# Patient Record
Sex: MALE | Race: WHITE | Employment: UNEMPLOYED | URBAN - NONMETROPOLITAN AREA
[De-identification: names, ages, dates, MRNs, and addresses within clinical notes are randomized per-mention and may not be internally consistent; named-entity substitution may affect disease eponyms.]

---

## 2017-07-10 ENCOUNTER — HOSPITAL ENCOUNTER (OUTPATIENT)
Dept: NEUROLOGY | Age: 27
Discharge: HOME | End: 2017-07-10
Payer: MEDICAID

## 2017-07-10 DIAGNOSIS — R20.2 NUMBNESS AND TINGLING IN BOTH HANDS: ICD-10-CM

## 2017-07-10 DIAGNOSIS — M79.641 PAIN IN BOTH HANDS: ICD-10-CM

## 2017-07-10 DIAGNOSIS — R20.0 NUMBNESS AND TINGLING IN BOTH HANDS: ICD-10-CM

## 2017-07-10 DIAGNOSIS — M54.2 PAIN, NECK: ICD-10-CM

## 2017-07-10 DIAGNOSIS — M79.642 PAIN IN BOTH HANDS: ICD-10-CM

## 2017-07-10 PROCEDURE — 95911 NRV CNDJ TEST 9-10 STUDIES: CPT | Performed by: PSYCHIATRY & NEUROLOGY

## 2017-07-10 PROCEDURE — 95886 MUSC TEST DONE W/N TEST COMP: CPT

## 2017-07-10 PROCEDURE — 95911 NRV CNDJ TEST 9-10 STUDIES: CPT

## 2017-07-10 PROCEDURE — 95886 MUSC TEST DONE W/N TEST COMP: CPT | Performed by: PSYCHIATRY & NEUROLOGY

## 2020-03-19 ENCOUNTER — NURSE TRIAGE (OUTPATIENT)
Dept: CALL CENTER | Facility: HOSPITAL | Age: 30
End: 2020-03-19

## 2020-03-19 NOTE — TELEPHONE ENCOUNTER
"    Reason for Disposition  • Health Information question, no triage required and triager able to answer question    Additional Information  • Negative: [1] Caller is not with the adult (patient) AND [2] reporting urgent symptoms  • Negative: Lab result questions  • Negative: Medication questions  • Negative: Caller can't be reached by phone  • Negative: Caller has already spoken to PCP or another triager  • Negative: RN needs further essential information from caller in order to complete triage  • Negative: Requesting regular office appointment  • Negative: [1] Caller requesting NON-URGENT health information AND [2] PCP's office is the best resource    Answer Assessment - Initial Assessment Questions  1. REASON FOR CALL or QUESTION: \"What is your reason for calling today?\" or \"How can I best help you?\" or \"What question do you have that I can help answer?\"      He has cough, nasal congestion, itchy, watery eyes. He wants to know if he should go to work with this. He feels that it is allergy/sinus related. He was instructed that he should not return to work until symptoms are gone or he is released by provider.    Protocols used: INFORMATION ONLY CALL-ADULT-      "

## 2020-04-07 ENCOUNTER — NURSE TRIAGE (OUTPATIENT)
Dept: CALL CENTER | Facility: HOSPITAL | Age: 30
End: 2020-04-07

## 2020-04-07 NOTE — TELEPHONE ENCOUNTER
"Red rash around tick bite about size of dime. Removed the tick ans says he removed the head. Area is red and tender to touch.    Reason for Disposition  • [1] Red or very tender (to touch) area AND [2] started over 24 hours after the bite    Additional Information  • Negative: Sounds like a life-threatening emergency to the triager  • Negative: Not a tick bite  • Negative: [1] 2 to 14 days following tick bite AND [2] severe headache with fever occurs  • Negative: [1] 2 to 14 days following tick bite AND [2] widespread rash with fever occurs  • Negative: Patient sounds very sick or weak to the triager  • Negative: [1] Fever AND [2] red area  • Negative: [1] Fever AND [2] area is very tender to touch  • Negative: [1] Red streak or red line AND [2] length > 2 inches (5 cm)  • Negative: Can't remove live tick (after trying Care Advice)  • Negative: Can't remove tick's head that was broken off in the skin (after trying Care Advice)  • Negative: [1] 2 to 14 days following tick bite AND [2] fever AND [3] no rash or headache  • Negative: [1] 2 to 14 days following tick bite AND [2] widespread rash or headache AND [3] no fever    Answer Assessment - Initial Assessment Questions  1. TYPE of TICK: \"Is it a wood tick or a deer tick?\" If unsure, ask: \"What size was the tick?\" \"Did it look more like a watermelon seed or a poppy seed?\"   small tick  2. LOCATION: \"Where is the tick bite located?\"      Behind right knee  3. ONSET: \"How long do you think the tick was attached before you removed it?\" (Hours or days)       Possibly 3 days  4. TETANUS: \"When was the last tetanus booster?\"      2017  5. PREGNANCY: \"Is there any chance you are pregnant?\" \"When was your last menstrual period?\"      na    Protocols used: TICK BITE-ADULT-AH      "

## 2020-04-11 ENCOUNTER — NURSE TRIAGE (OUTPATIENT)
Dept: CALL CENTER | Facility: HOSPITAL | Age: 30
End: 2020-04-11

## 2020-04-11 NOTE — TELEPHONE ENCOUNTER
"Has been taking Doxycycline for 3 days for a tick bite. C/o abd pain and wonders if it could be the antibiotic.  He takes quite a bit of Excedrin Naprosyn for headaches, has considered pain may be ulcer pain.  Advised to take Tylenol for headache.  Excedrin and NSAIDs can be irritating to the stomach. Advised to take antibiotic with food.   Caller says he had a BM today and it was normal. Care advice per guideline.    Reason for Disposition  • [1] MILD-MODERATE pain AND [2] comes and goes (cramps)    Additional Information  • Negative: Shock suspected (e.g., cold/pale/clammy skin, too weak to stand, low BP, rapid pulse)  • Negative: Difficult to awaken or acting confused (e.g., disoriented, slurred speech)  • Negative: Passed out (i.e., lost consciousness, collapsed and was not responding)  • Negative: Sounds like a life-threatening emergency to the triager  • Negative: Chest pain  • Negative: Pain is mainly in upper abdomen  (if needed ask: \"is it mainly above the belly button?\")  • Negative: Followed an abdomen (stomach) injury  • Negative: [1] SEVERE pain (e.g., excruciating) AND [2] present > 1 hour  • Negative: [1] SEVERE pain AND [2] age > 60  • Negative: [1] Vomiting AND [2] contains red blood or black (\"coffee ground\") material  (Exception: few red streaks in vomit that only happened once)  • Negative: Blood in bowel movements  (Exception: Blood on surface of BM with constipation)  • Negative: Black or tarry bowel movements  (Exception: chronic-unchanged  black-grey bowel movements AND is taking iron pills or Pepto-bismol)  • Negative: [1] Unable to urinate (or only a few drops) > 4 hours AND [2] bladder feels very full (e.g., palpable bladder or strong urge to urinate)  • Negative: [1] Pain in the scrotum or testicle AND [2] present > 1 hour  • Negative: Patient sounds very sick or weak to the triager  • Negative: [1] MILD-MODERATE pain AND [2] constant AND [3] present > 2 hours  • Negative: [1] Vomiting " "AND [2] abdomen looks much more swollen than usual  • Negative: [1] Vomiting AND [2] contains bile (green color)  • Negative: White of the eyes have turned yellow (i.e., jaundice)  • Negative: Fever > 103 F (39.4 C)  • Negative: [1] Fever > 101 F (38.3 C) AND [2] age > 60  • Negative: [1] Fever > 100.0 F (37.8 C) AND [2] bedridden (e.g., nursing home patient, CVA, chronic illness, recovering from surgery)  • Negative: [1] Fever > 100.0 F (37.8 C) AND [2] diabetes mellitus or weak immune system (e.g., HIV positive, cancer chemo, splenectomy, organ transplant, chronic steroids)  • Negative: [1] SEVERE pain AND [2] present < 1 hour  • Negative: [1] MODERATE pain (e.g., interferes with normal activities) AND [2] pain comes and goes (cramps) AND [3] present > 24 hours  (Exception: pain with Vomiting or Diarrhea - see that Guideline)  • Negative: [1] MILD pain (e.g., does not interfere with normal activities) AND [2] pain comes and goes (cramps) [3] present > 48 hours  • Negative: Age > 60 years  • Negative: Blood in urine (red, pink, or tea-colored)  • Negative: Abdominal pain is a chronic symptom (recurrent or ongoing AND present > 4 weeks)  • Negative: [1] MILD-MODERATE pain AND [2] constant and [3] present < 2 hours    Answer Assessment - Initial Assessment Questions  1. LOCATION: \"Where does it hurt?\"    right upper side, about at rib cage area and hip bone  2. RADIATION: \"Does the pain shoot anywhere else?\" (e.g., chest, back)    Left side of back feels sore  3. ONSET: \"When did the pain begin?\" (Minutes, hours or days ago)      Last night about 7pm  4. SUDDEN: \"Gradual or sudden onset?\"    sudden  5. PATTERN \"Does the pain come and go, or is it constant?\"     - If constant: \"Is it getting better, staying the same, or worsening?\"       (Note: Constant means the pain never goes away completely; most serious pain is constant and it progresses)      - If intermittent: \"How long does it last?\" \"Do you have pain now?\"    " "  (Note: Intermittent means the pain goes away completely between bouts)  Last night felt like something was about to blow, but much better today but is lingering  6. SEVERITY: \"How bad is the pain?\"  (e.g., Scale 1-10; mild, moderate, or severe)     - MILD (1-3): doesn't interfere with normal activities, abdomen soft and not tender to touch      - MODERATE (4-7): interferes with normal activities or awakens from sleep, tender to touch      - SEVERE (8-10): excruciating pain, doubled over, unable to do any normal activities       Now 2/10    Last night 10/10  7. RECURRENT SYMPTOM: \"Have you ever had this type of abdominal pain before?\" If so, ask: \"When was the last time?\" and \"What happened that time?\"   Yes, if he drinks a soda really fast  8. CAUSE: \"What do you think is causing the abdominal pain?\"     Doesn't know  9. RELIEVING/AGGRAVATING FACTORS: \"What makes it better or worse?\" (e.g., movement, antacids, bowel movement)     Seems like today if he ate or drank it would bring it back a little bit  10. OTHER SYMPTOMS: \"Has there been any vomiting, diarrhea, constipation, or urine problems?\"       Made himself vomit last night by drinking 2 gatorade bottle full of warm water. He looked up on line how to ease a stomach ulcer and this is what was recommended.    Protocols used: ABDOMINAL PAIN - MALE-ADULT-AH      "

## 2020-09-24 ENCOUNTER — NURSE TRIAGE (OUTPATIENT)
Dept: CALL CENTER | Facility: HOSPITAL | Age: 30
End: 2020-09-24

## 2020-09-24 NOTE — TELEPHONE ENCOUNTER
Reviewed guideline with caller advises he be seen in am, Caller agrees to follow care advice.     Reason for Disposition  • [1] Symptoms of pill stuck in throat or esophagus (e.g., pain in throat or chest, FB sensation) AND [2] no relief after using CARE ADVICE    Additional Information  • Negative: Any difficulty breathing (e.g., coughing, wheezing or stridor)  • Negative: Sounds like a life-threatening emergency to the triager  • Negative: Choked on or inhaled food or foreign body (but did not swallow it)  • Negative: Symptoms of blocked esophagus (e.g., can't swallow normal secretions, drooling)  • Negative: Symptoms of FB stuck in throat or esophagus (e.g., continued pain in throat or chest, FB sensation, blood-tinged saliva) (Exception: pill stuck)  • Negative: Can't swallow water or bread  • Negative: Sharp object  (e.g., needle, nail, safety pin, toothpick, bone, bottle cap, pull tab, dental bridge work)     (Exception: tiny chips of glass generally pass without any symptoms)  • Negative: Button battery (or other battery) known or suspected  • Negative: Magnet  • Negative: Packet of drugs (e.g., condom filled with cocaine)  • Negative: Swallowed 2 or more FBs (e.g., multiple objects)  • Negative: Swallowed a poisonous object (e.g., a lead sinker or a bullet)  • Negative: Swallowed a (non-food) FB on purpose  • Negative: SEVERE symptoms of pill stuck in throat or esophagus (e.g., severe pain, bleeding, or inability to swallow liquids)  • Negative: Blood in the stools  • Negative: [1] Abdominal pain AND [2] FB hasn't passed  • Negative: [1] Vomited 2 or more times AND [2] FB hasn't passed  • Negative: Patient sounds very sick or weak to the triager  • Negative: Patient is confused or is an unreliable provider of information (e.g., dementia, severe intellectual disability, alcohol intoxication)  • Negative: [1] Smooth object > 1 inch (2.5 cm) across (e.g., quarter is 2.5 cm) AND [2] no symptoms  • Negative: [1]  "More than 3 days since swallowed AND [2] FB hasn't passed in the stools AND [3] FB is of concern (e.g., sharp, large, valuable, etc.)  • Negative: Pill stuck in esophagus  • Negative: Preventing pills getting stuck in esophagus, questions about  • Negative: Harmless swallowed FB and no symptoms    Answer Assessment - Initial Assessment Questions  1. OBJECT: \"What is it?\"       Spicy chip  2. SIZE: \"How large is it?\" (inches or cm, or compare it to standard coins)       Piece of a chip  3. ONSET: \"How long ago did you swallow it?\" (e.g., minutes, hours)       25 minutes ago   4. MECHANISM: \"Tell me how it happened.\"       Eating chips and felt like they got stuck  5. OTHER SYMPTOMS: \"Are there any other symptoms?\" (e.g., pain in neck or chest, difficulty breathing, difficulty swallowing)      When he swallows it feels like something is stuck in his throat  6. PREGNANCY: \"Is there any chance you are pregnant?\" \"When was your last menstrual period?\"      na    Protocols used: SWALLOWED FOREIGN BODY-ADULT-AH      "

## 2021-02-14 ENCOUNTER — NURSE TRIAGE (OUTPATIENT)
Dept: CALL CENTER | Facility: HOSPITAL | Age: 31
End: 2021-02-14

## 2021-02-15 NOTE — TELEPHONE ENCOUNTER
"States he has tooth pain. States it is \"in the back where my wisdom tooth used to be\". States on the left side. Denies swelling. States it has been constant since Wednesday. States it feels like it jaw/back of the mouth than tooth.     Reason for Disposition  • [1] MILD-MODERATE mouth pain AND [2] present > 3 days    Additional Information  • Negative: Severe difficulty breathing (e.g., struggling for each breath, speaks in single words, stridor)  • Negative: Sounds like a life-threatening emergency to the triager  • Negative: Followed a tooth (or teeth) injury  • Negative: Followed a mouth injury  • Negative: Throat is painful  • Negative: Canker sore suspected (i.e., aphthous ulcer) in the mouth  • Negative: Cold sore suspected (i.e., fever blister sore) on the outer lip  • Negative: Tooth is painful or swelling around a tooth  • Negative: [1] Drooling or spitting out saliva (because can't swallow) AND [2] new onset  • Negative: Electrical burn of mouth  • Negative: Chemical burn of mouth  • Negative: Tongue is very swollen and tender  • Negative: [1] Difficulty breathing AND [2] not severe  • Negative: [1] Face is swollen AND [2] fever  • Negative: [1] Drinking very little AND [2] dehydration suspected (e.g., no urine > 12 hours, very dry mouth, very lightheaded)  • Negative: Patient sounds very sick or weak to the triager  • Negative: [1] SEVERE mouth pain (e.g., excruciating) AND [2] not improved after 2 hours of pain medicine  • Negative: Face is very swollen  • Negative: Large lymph node (> 1 inch or 2.5 cm) under the jaw  • Negative: White patches that stick to tongue or inner cheek  • Negative: Diabetes mellitus or weak immune system (e.g., HIV positive, cancer chemo, splenectomy, organ transplant, chronic steroids)  • Negative: [1] Face is swollen AND [2] no fever  • Negative: Receiving chemotherapy or radiation therapy    Answer Assessment - Initial Assessment Questions  1. ONSET: \"When did the mouth " "start hurting?\" (e.g., hours or days ago)       See note  2. SEVERITY: \"How bad is the pain?\" (Scale 1-10; mild, moderate or severe)    - MILD (1-3):  doesn't interfere with eating or normal activities    - MODERATE (4-7): interferes with eating some solids and normal activities    - SEVERE (8-10):  excruciating pain, interferes with most normal activities    - SEVERE DYSPHAGIA: can't swallow liquids, drooling      4-10  3. SORES: \"Are there any sores or ulcers in the mouth?\" If so, ask: \"What part of the mouth are the sores in?\"      no  4. FEVER: \"Do you have a fever?\" If so, ask: \"What is your temperature, how was it measured, and when did it start?\"      no  5. CAUSE: \"What do you think is causing the mouth pain?\"      unknown  6. OTHER SYMPTOMS: \"Do you have any other symptoms?\" (e.g., difficulty breathing)      no    Protocols used: MOUTH PAIN-ADULT-AH      "

## 2021-02-16 ENCOUNTER — NURSE TRIAGE (OUTPATIENT)
Dept: CALL CENTER | Facility: HOSPITAL | Age: 31
End: 2021-02-16

## 2021-02-16 NOTE — TELEPHONE ENCOUNTER
"    Reason for Disposition  • Toothache present > 24 hours    Additional Information  • Negative: Shock suspected (e.g., cold/pale/clammy skin, too weak to stand, low BP, rapid pulse)  • Negative: [1] Similar pain previously AND [2] it was from \"heart attack\"  • Negative: [1] Similar pain previously AND [2] it was from \"angina\" AND [3] not relieved by nitroglycerin  • Negative: Sounds like a life-threatening emergency to the triager  • Negative: Chest pain  • Negative: Toothache followed tooth injury  • Negative: Toothache or mouth pain after tooth extraction  • Negative: Canker sore (i.e., aphthous ulcer)  • Negative: Tongue is very swollen and tender  • Negative: [1] Face is swollen AND [2] fever  • Negative: Patient sounds very sick or weak to the triager  • Negative: [1] SEVERE pain (e.g., excruciating, unable to do any normal activities) AND [2] not improved 2 hours after pain medicine  • Negative: Face is very swollen  • Negative: Fever  • Negative: [1] Face is swollen AND [2] no fever    Answer Assessment - Initial Assessment Questions  1. LOCATION: \"Which tooth is hurting?\"  (e.g., right-side/left-side, upper/lower, front/back)      Left Molar    2. ONSET: \"When did the toothache start?\"  (e.g., hours, days)       1 week ago    3. SEVERITY: \"How bad is the toothache?\"  (Scale 1-10; mild, moderate or severe)    - MILD (1-3): doesn't interfere with chewing     - MODERATE (4-7): interferes with chewing, interferes with normal activities, awakens from sleep      - SEVERE (8-10): unable to eat, unable to do any normal activities, excruciating pain         Moderate    4. SWELLING: \"Is there any visible swelling of your face?\"      Denies swelling.    5. OTHER SYMPTOMS: \"Do you have any other symptoms?\" (e.g., fever)      Hurts worse if he bites down on tooth.    6. PREGNANCY: \"Is there any chance you are pregnant?\" \"When was your last menstrual period?\"      Male    Protocols used: TOOTHACHE-ADULT-AH      "